# Patient Record
(demographics unavailable — no encounter records)

---
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
## 2017-07-26 NOTE — EKG
Test Reason : 

Blood Pressure : ***/*** mmHG

Vent. Rate : 058 BPM     Atrial Rate : 058 BPM

   P-R Int : 144 ms          QRS Dur : 100 ms

    QT Int : 440 ms       P-R-T Axes : 051 -65 068 degrees

   QTc Int : 431 ms

 

SINUS BRADYCARDIA

POSSIBLE LEFT ATRIAL ENLARGEMENT

INCOMPLETE RIGHT BUNDLE BRANCH BLOCK

LEFT ANTERIOR FASCICULAR BLOCK

ABNORMAL ECG

NO PREVIOUS ECGS AVAILABLE

Confirmed by THEA MENDOZA, BRIANDA ([id number]) on 7/26/2017 12:22:36 PM

 

Referred By: Christ Briggs           Confirmed By:BRIANDA SIDHU MD

## 2017-07-26 NOTE — PN
[facility] CIWA





- CIWA Score


Nausea/Vomitin-No Nausea/No Vomiting


Muscle Tremors: 4-Moderate,w/Arms Extend


Anxiety: 4-Mod. Anxious/Guarded


Agitation: 4-Moderately Restless


Paroxysmal Sweats: 1-Minimal Palms Moist


Orientation: 0-Oriented


Tacttile Disturbances: 3-Moderate Itch/Numb/Burn


Auditory Disturbances: 0-None


Visual Disturbances: 0-None


Headache: 0-None Present


CIWA-Ar Total Score: 16





[facility] Progress Note (SOAP)


Subjective: 


ANXIETY,TREMORS,SWEATS,FATIGUE, DIARRHEA.


Objective: 





17 12:42


 Vital Signs











Temperature  97.3 F L  17 09:27


 


Pulse Rate  64   17 09:27


 


Respiratory Rate  16   17 09:27


 


Blood Pressure  118/80   17 09:27


 


O2 Sat by Pulse Oximetry (%)      








 Laboratory Last Values











WBC  11.3 K/mm3 (4.0-10.0)  H D 17  06:30    


 


RBC  4.38 M/mm3 (4.00-5.60)   17  06:30    


 


Hgb  11.2 GM/dL (11.7-16.9)  L  17  06:30    


 


Hct  33.4 % (35.4-49)  L  17  06:30    


 


MCV  76.3 fl (80-96)  L  17  06:30    


 


MCH  25.6 pg (25.7-33.7)  L  17  06:30    


 


MCHC  33.6 g/dl (32.0-35.9)   17  06:30    


 


RDW  17.2 % (11.9-15.9)  H  17  06:30    


 


Plt Count  260 K/MM3 (134-434)   17  06:30    


 


MPV  7.6 fl (7.5-11.1)   17  06:30    


 


Sodium  141 mmol/L (136-145)   17  06:30    


 


Potassium  4.0 mmol/L (3.5-5.1)   17  06:30    


 


Chloride  104 mmol/L ()   17  06:30    


 


Carbon Dioxide  29 mmol/L (21-32)   17  06:30    


 


Anion Gap  8  (8-16)   17  06:30    


 


BUN  7 mg/dL (7-18)  D 17  06:30    


 


Creatinine  0.8 mg/dL (0.7-1.3)   17  06:30    


 


Creat Clearance w eGFR  > 60  (>60)   17  06:30    


 


Random Glucose  85 mg/dL ()   17  06:30    


 


Calcium  8.5 mg/dL (8.5-10.1)   17  06:30    


 


Total Bilirubin  0.4 mg/dL (0.2-1.0)  D 17  06:30    


 


AST  57 U/L (15-37)  H D 17  06:30    


 


ALT  52 U/L (12-78)   17  06:30    


 


Alkaline Phosphatase  90 U/L ()   17  06:30    


 


Total Protein  7.2 g/dl (6.4-8.2)   17  06:30    


 


Albumin  3.1 g/dl (3.4-5.0)  L  17  06:30    


 


Urine Color  Yellow   17  20:00    


 


Urine Appearance  Clear   17  20:00    


 


Urine pH  5.0  (5.0-8.0)   17  20:00    


 


Ur Specific Gravity  1.020  (1.005-1.025)   17  20:00    


 


Urine Protein  Negative  (NEGATIVE)   17  20:00    


 


Urine Glucose (UA)  Negative  (NEGATIVE)   17  20:00    


 


Urine Ketones  Negative  (NEGATIVE)   17  20:00    


 


Urine Blood  1+  (NEGATIVE)  H  17  20:00    


 


Urine Nitrite  Negative  (NEGATIVE)   17  20:00    


 


Urine Bilirubin  Negative  (NEGATIVE)   17  20:00    


 


Urine Urobilinogen  Negative mg/dL (0.2-1.0)   17  20:00    


 


Ur Leukocyte Esterase  Negative  (NEGATIVE)   17  20:00    


 


Urine RBC  1 /hpf (0-3)   17  20:00    


 


Urine WBC  1 /hpf (3-5)   17  20:00    


 


Ur Epithelial Cells  Rare /hpf (FEW)   17  20:00    


 


Urine Bacteria  Rare /hpf (NONE SEEN)   17  20:00    


 


RPR Titer  Nonreactive  (NONREACTIVE)   17  06:30    











Assessment: 





17 12:43


WITHDRAWAL SX


Plan: 


CONTINUE DETOX

## 2017-07-27 NOTE — PN
[facility] CIWA





- CIWA Score


Nausea/Vomitin-No Nausea/No Vomiting


Muscle Tremors: 4-Moderate,w/Arms Extend


Anxiety: 4-Mod. Anxious/Guarded


Agitation: 4-Moderately Restless


Paroxysmal Sweats: 1-Minimal Palms Moist


Orientation: 0-Oriented


Tacttile Disturbances: 3-Moderate Itch/Numb/Burn


Auditory Disturbances: 0-None


Visual Disturbances: 0-None


Headache: 0-None Present


CIWA-Ar Total Score: 16





[facility] Progress Note (SOAP)


Subjective: 


SLIGHT ANXIETY,TREMORS,FATIGUE. LESS AGITATION THIS MORNING.


Objective: 





17 10:54


 Vital Signs











Temperature  98.7 F   17 09:49


 


Pulse Rate  60   17 09:49


 


Respiratory Rate  20   17 09:49


 


Blood Pressure  122/75   17 09:49


 


O2 Sat by Pulse Oximetry (%)      








 Laboratory Last Values











WBC  9.1 K/mm3 (4.0-10.0)   17  06:30    


 


RBC  4.18 M/mm3 (4.00-5.60)   17  06:30    


 


Hgb  10.8 GM/dL (11.7-16.9)  L  17  06:30    


 


Hct  32.2 % (35.4-49)  L  17  06:30    


 


MCV  77.0 fl (80-96)  L  17  06:30    


 


MCH  25.8 pg (25.7-33.7)   17  06:30    


 


MCHC  33.5 g/dl (32.0-35.9)   17  06:30    


 


RDW  17.8 % (11.9-15.9)  H  17  06:30    


 


Plt Count  253 K/MM3 (134-434)   17  06:30    


 


MPV  7.9 fl (7.5-11.1)   17  06:30    


 


Neutrophils %  51.0 % (42.8-82.8)   17  06:30    


 


Lymphocytes %  26.8 % (8-40)   17  06:30    


 


Monocytes %  18.2 % (3.8-10.2)  H  17  06:30    


 


Eosinophils %  3.1 % (0-4.5)   17  06:30    


 


Basophils %  0.9 % (0-2.0)   17  06:30    


 


Sodium  141 mmol/L (136-145)   17  06:30    


 


Potassium  4.0 mmol/L (3.5-5.1)   17  06:30    


 


Chloride  104 mmol/L ()   17  06:30    


 


Carbon Dioxide  29 mmol/L (21-32)   17  06:30    


 


Anion Gap  8  (8-16)   17  06:30    


 


BUN  7 mg/dL (7-18)  D 17  06:30    


 


Creatinine  0.8 mg/dL (0.7-1.3)   17  06:30    


 


Creat Clearance w eGFR  > 60  (>60)   17  06:30    


 


Random Glucose  85 mg/dL ()   17  06:30    


 


Calcium  8.5 mg/dL (8.5-10.1)   17  06:30    


 


Total Bilirubin  0.4 mg/dL (0.2-1.0)  D 17  06:30    


 


AST  57 U/L (15-37)  H D 17  06:30    


 


ALT  52 U/L (12-78)   17  06:30    


 


Alkaline Phosphatase  90 U/L ()   17  06:30    


 


Total Protein  7.2 g/dl (6.4-8.2)   17  06:30    


 


Albumin  3.1 g/dl (3.4-5.0)  L  17  06:30    


 


Urine Color  Yellow   17  20:00    


 


Urine Appearance  Clear   17  20:00    


 


Urine pH  5.0  (5.0-8.0)   17  20:00    


 


Ur Specific Gravity  1.020  (1.005-1.025)   17  20:00    


 


Urine Protein  Negative  (NEGATIVE)   17  20:00    


 


Urine Glucose (UA)  Negative  (NEGATIVE)   17  20:00    


 


Urine Ketones  Negative  (NEGATIVE)   17  20:00    


 


Urine Blood  1+  (NEGATIVE)  H  17  20:00    


 


Urine Nitrite  Negative  (NEGATIVE)   17  20:00    


 


Urine Bilirubin  Negative  (NEGATIVE)   17  20:00    


 


Urine Urobilinogen  Negative mg/dL (0.2-1.0)   17  20:00    


 


Ur Leukocyte Esterase  Negative  (NEGATIVE)   17  20:00    


 


Urine RBC  1 /hpf (0-3)   17  20:00    


 


Urine WBC  1 /hpf (3-5)   17  20:00    


 


Ur Epithelial Cells  Rare /hpf (FEW)   17  20:00    


 


Urine Bacteria  Rare /hpf (NONE SEEN)   17  20:00    


 


RPR Titer  Nonreactive  (NONREACTIVE)   17  06:30    











Assessment: 





17 10:55


WITHDRAWAL SX


Plan: 


CONTINUE DETOX

## 2017-07-28 NOTE — PN
[facility] Progress Note (SOAP)


Subjective: 


ALERT O X 3. NAD. ANXIETY SWEATS.


Objective: 





07/28/17 10:53


 Vital Signs











Temperature  96.9 F L  07/28/17 09:13


 


Pulse Rate  69   07/28/17 09:13


 


Respiratory Rate  18   07/28/17 09:13


 


Blood Pressure  112/82   07/28/17 09:13


 


O2 Sat by Pulse Oximetry (%)      








 Laboratory Last Values











WBC  9.1 K/mm3 (4.0-10.0)   07/27/17  06:30    


 


RBC  4.18 M/mm3 (4.00-5.60)   07/27/17  06:30    


 


Hgb  10.8 GM/dL (11.7-16.9)  L  07/27/17  06:30    


 


Hct  32.2 % (35.4-49)  L  07/27/17  06:30    


 


MCV  77.0 fl (80-96)  L  07/27/17  06:30    


 


MCH  25.8 pg (25.7-33.7)   07/27/17  06:30    


 


MCHC  33.5 g/dl (32.0-35.9)   07/27/17  06:30    


 


RDW  17.8 % (11.9-15.9)  H  07/27/17  06:30    


 


Plt Count  253 K/MM3 (134-434)   07/27/17  06:30    


 


MPV  7.9 fl (7.5-11.1)   07/27/17  06:30    


 


Neutrophils %  51.0 % (42.8-82.8)   07/27/17  06:30    


 


Lymphocytes %  26.8 % (8-40)   07/27/17  06:30    


 


Monocytes %  18.2 % (3.8-10.2)  H  07/27/17  06:30    


 


Eosinophils %  3.1 % (0-4.5)   07/27/17  06:30    


 


Basophils %  0.9 % (0-2.0)   07/27/17  06:30    


 


Sodium  141 mmol/L (136-145)   07/26/17  06:30    


 


Potassium  4.0 mmol/L (3.5-5.1)   07/26/17  06:30    


 


Chloride  104 mmol/L ()   07/26/17  06:30    


 


Carbon Dioxide  29 mmol/L (21-32)   07/26/17  06:30    


 


Anion Gap  8  (8-16)   07/26/17  06:30    


 


BUN  7 mg/dL (7-18)  D 07/26/17  06:30    


 


Creatinine  0.8 mg/dL (0.7-1.3)   07/26/17  06:30    


 


Creat Clearance w eGFR  > 60  (>60)   07/26/17  06:30    


 


Random Glucose  85 mg/dL ()   07/26/17  06:30    


 


Calcium  8.5 mg/dL (8.5-10.1)   07/26/17  06:30    


 


Total Bilirubin  0.4 mg/dL (0.2-1.0)  D 07/26/17  06:30    


 


AST  57 U/L (15-37)  H D 07/26/17  06:30    


 


ALT  52 U/L (12-78)   07/26/17  06:30    


 


Alkaline Phosphatase  90 U/L ()   07/26/17  06:30    


 


Total Protein  7.2 g/dl (6.4-8.2)   07/26/17  06:30    


 


Albumin  3.1 g/dl (3.4-5.0)  L  07/26/17  06:30    


 


Urine Color  Yellow   07/25/17  20:00    


 


Urine Appearance  Clear   07/25/17  20:00    


 


Urine pH  5.0  (5.0-8.0)   07/25/17  20:00    


 


Ur Specific Gravity  1.020  (1.005-1.025)   07/25/17  20:00    


 


Urine Protein  Negative  (NEGATIVE)   07/25/17  20:00    


 


Urine Glucose (UA)  Negative  (NEGATIVE)   07/25/17  20:00    


 


Urine Ketones  Negative  (NEGATIVE)   07/25/17  20:00    


 


Urine Blood  1+  (NEGATIVE)  H  07/25/17  20:00    


 


Urine Nitrite  Negative  (NEGATIVE)   07/25/17  20:00    


 


Urine Bilirubin  Negative  (NEGATIVE)   07/25/17  20:00    


 


Urine Urobilinogen  Negative mg/dL (0.2-1.0)   07/25/17  20:00    


 


Ur Leukocyte Esterase  Negative  (NEGATIVE)   07/25/17  20:00    


 


Urine RBC  1 /hpf (0-3)   07/25/17  20:00    


 


Urine WBC  1 /hpf (3-5)   07/25/17  20:00    


 


Ur Epithelial Cells  Rare /hpf (FEW)   07/25/17  20:00    


 


Urine Bacteria  Rare /hpf (NONE SEEN)   07/25/17  20:00    


 


RPR Titer  Nonreactive  (NONREACTIVE)   07/26/17  06:30    











Assessment: 





07/28/17 10:53


WITHDRAWAL SX


Plan: 


CONTINUE DETOX
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
